# Patient Record
Sex: MALE | Race: WHITE | Employment: UNEMPLOYED | URBAN - METROPOLITAN AREA
[De-identification: names, ages, dates, MRNs, and addresses within clinical notes are randomized per-mention and may not be internally consistent; named-entity substitution may affect disease eponyms.]

---

## 2017-01-02 ENCOUNTER — THERAPY VISIT (OUTPATIENT)
Dept: PHYSICAL THERAPY | Facility: CLINIC | Age: 32
End: 2017-01-02
Payer: MEDICAID

## 2017-01-02 DIAGNOSIS — M54.6 CHRONIC BILATERAL THORACIC BACK PAIN: ICD-10-CM

## 2017-01-02 DIAGNOSIS — G89.29 CHRONIC BILATERAL THORACIC BACK PAIN: ICD-10-CM

## 2017-01-02 DIAGNOSIS — M54.2 CERVICAL PAIN: Primary | ICD-10-CM

## 2017-01-02 PROCEDURE — 97530 THERAPEUTIC ACTIVITIES: CPT | Mod: GP | Performed by: PHYSICAL THERAPIST

## 2017-01-09 ENCOUNTER — THERAPY VISIT (OUTPATIENT)
Dept: PHYSICAL THERAPY | Facility: CLINIC | Age: 32
End: 2017-01-09
Payer: MEDICAID

## 2017-01-09 DIAGNOSIS — G89.29 CHRONIC BILATERAL THORACIC BACK PAIN: ICD-10-CM

## 2017-01-09 DIAGNOSIS — M54.2 CERVICAL PAIN: Primary | ICD-10-CM

## 2017-01-09 DIAGNOSIS — M54.6 CHRONIC BILATERAL THORACIC BACK PAIN: ICD-10-CM

## 2017-01-09 PROCEDURE — 97530 THERAPEUTIC ACTIVITIES: CPT | Mod: GP | Performed by: PHYSICAL THERAPIST

## 2017-01-09 PROCEDURE — 97110 THERAPEUTIC EXERCISES: CPT | Mod: GP | Performed by: PHYSICAL THERAPIST

## 2017-01-16 ENCOUNTER — THERAPY VISIT (OUTPATIENT)
Dept: PHYSICAL THERAPY | Facility: CLINIC | Age: 32
End: 2017-01-16
Payer: MEDICAID

## 2017-01-16 DIAGNOSIS — M54.6 CHRONIC BILATERAL THORACIC BACK PAIN: ICD-10-CM

## 2017-01-16 DIAGNOSIS — G89.29 CHRONIC BILATERAL THORACIC BACK PAIN: ICD-10-CM

## 2017-01-16 DIAGNOSIS — M54.2 CERVICAL PAIN: Primary | ICD-10-CM

## 2017-01-16 PROCEDURE — 97110 THERAPEUTIC EXERCISES: CPT | Mod: GP | Performed by: PHYSICAL THERAPIST

## 2017-01-16 PROCEDURE — 97530 THERAPEUTIC ACTIVITIES: CPT | Mod: GP | Performed by: PHYSICAL THERAPIST

## 2017-01-23 ENCOUNTER — THERAPY VISIT (OUTPATIENT)
Dept: PHYSICAL THERAPY | Facility: CLINIC | Age: 32
End: 2017-01-23
Payer: MEDICAID

## 2017-01-23 DIAGNOSIS — G89.29 CHRONIC BILATERAL THORACIC BACK PAIN: ICD-10-CM

## 2017-01-23 DIAGNOSIS — M54.2 CERVICAL PAIN: Primary | ICD-10-CM

## 2017-01-23 DIAGNOSIS — M54.6 CHRONIC BILATERAL THORACIC BACK PAIN: ICD-10-CM

## 2017-01-23 PROCEDURE — 97110 THERAPEUTIC EXERCISES: CPT | Mod: GP | Performed by: PHYSICAL THERAPIST

## 2017-01-23 PROCEDURE — 97530 THERAPEUTIC ACTIVITIES: CPT | Mod: GP | Performed by: PHYSICAL THERAPIST

## 2017-01-30 ENCOUNTER — THERAPY VISIT (OUTPATIENT)
Dept: PHYSICAL THERAPY | Facility: CLINIC | Age: 32
End: 2017-01-30
Payer: MEDICAID

## 2017-01-30 DIAGNOSIS — M54.6 CHRONIC BILATERAL THORACIC BACK PAIN: ICD-10-CM

## 2017-01-30 DIAGNOSIS — M54.2 CERVICAL PAIN: Primary | ICD-10-CM

## 2017-01-30 DIAGNOSIS — G89.29 CHRONIC BILATERAL THORACIC BACK PAIN: ICD-10-CM

## 2017-01-30 PROCEDURE — 97530 THERAPEUTIC ACTIVITIES: CPT | Mod: GP | Performed by: PHYSICAL THERAPIST

## 2017-01-31 NOTE — PROGRESS NOTES
Subjective:    HPI                    Objective:    System    Physical Exam    General     ROS    Assessment/Plan:      PROGRESS  REPORT    OVERALL IMPRESSION:  Pt noted a very small amount of improvement in pain and function with a treatment emphasis focusing on the psychosocial component of pain with weekly functional and achievable short term goal setting.  At this point, I recommended that the patient continues with the prescribed home exercise program, gym exercises, and follow up as needed with physical therapy.  I also recommended the patient consider following up with a different therapist to exhaust all possible treatment options as the patient is against surgery, acupuncture, manual therapy, and a CSI's.  He feels there is almost no benefit of referring him back to the MD since there is nothing they can offer him.  Some other options discussed with other providers at our clinic are the benefit of a different set of eyes, TPR, and dry needling.  I recommend no more than a few more visits with another provider as the patient as already had extensive treatment between 2 different providers at our clinic.     SUBJECTIVE  Pt reports he has been having some issues with the neck and chest.  He feels like something needs cracked.  He feels like he may have over did it with something.  Overall, he feels like he has made some progress, but still a lot of ups and downs.      OBJECTIVE  Posture: Significant thoracic kyphosis, GHJ IR, FHP  Persisting RTC, scapular, and deep cx neck flexor weakness.  Neck ROM WNL        ASSESSMENT/PLAN  Updated problem list and treatment plan: Diagnosis 1:  Neck pain  Pain -  hot/cold therapy  Decreased ROM/flexibility - manual therapy and therapeutic exercise  Decreased joint mobility - manual therapy and therapeutic exercise  Decreased strength - therapeutic exercise and therapeutic activities  Decreased proprioception - neuro re-education and therapeutic activities  Inflammation -  cold therapy  Impaired muscle performance - neuro re-education  Decreased function - therapeutic activities  Impaired posture - neuro re-education  STG/LTGs have been met or progress has been made towards goals:  Yes (See Goal flow sheet completed today.)  Assessment of Progress: The patient's condition is improving.  Self Management Plans:  Patient has been instructed in a home treatment program.  I have re-evaluated this patient and find that the nature, scope, duration and intensity of the therapy is appropriate for the medical condition of the patient.  Pipe continues to require the following intervention to meet STG and LTG's:  PT    Recommendations:  This patient would benefit from continued therapy.     Frequency:  1 X week, once daily  Duration:  for 4 weeks      Please refer to the daily flowsheet for treatment today, total treatment time and time spent performing 1:1 timed codes.

## 2017-03-09 ENCOUNTER — THERAPY VISIT (OUTPATIENT)
Dept: PHYSICAL THERAPY | Facility: CLINIC | Age: 32
End: 2017-03-09
Payer: MEDICAID

## 2017-03-09 DIAGNOSIS — M54.6 CHRONIC BILATERAL THORACIC BACK PAIN: ICD-10-CM

## 2017-03-09 DIAGNOSIS — M54.2 CERVICAL PAIN: ICD-10-CM

## 2017-03-09 DIAGNOSIS — G89.29 CHRONIC BILATERAL THORACIC BACK PAIN: ICD-10-CM

## 2017-03-09 PROCEDURE — 97035 APP MDLTY 1+ULTRASOUND EA 15: CPT | Mod: GP | Performed by: PHYSICAL THERAPIST

## 2017-03-09 PROCEDURE — 97530 THERAPEUTIC ACTIVITIES: CPT | Mod: GP | Performed by: PHYSICAL THERAPIST

## 2017-03-09 NOTE — MR AVS SNAPSHOT
After Visit Summary   3/9/2017    Pipe Arzate    MRN: [id number]           Patient Information     Date Of Birth          1985        Visit Information        Provider Department      3/9/2017 4:50 PM Leslie aNranjo PT [facility]        Today's Diagnoses     Cervical pain        Chronic bilateral thoracic back pain           Follow-ups after your visit        Your next 10 appointments already scheduled     Mar 23, 2017  4:50 PM CDT   HANK Spine with Leslie Naranjo PT   [facility] ( [facility])    [address] [place] [id number] [phone]              Who to contact     If you have questions or need follow up information about today's clinic visit or your schedule please contact [facility] directly at [phone].  Normal or non-critical lab and imaging results will be communicated to you by [employer], letter or phone within 4 business days after the clinic has received the results. If you do not hear from us within 7 days, please contact the clinic through [employer] or phone. If you have a critical or abnormal lab result, we will notify you by phone as soon as possible.  Submit refill requests through [employer] or call your pharmacy and they will forward the refill request to us. Please allow 3 business days for your refill to be completed.          Additional Information About Your Visit        MyChart Information     [employer] gives you secure access to your electronic health record. If you see a primary care provider, you can also send messages to your care team and make appointments. If you have questions, please call your primary care clinic.  If you do not have a primary care provider, please call [phone] and they will assist you.        Care EveryWhere ID     This is your Care EveryWhere ID. This could be used by other organizations  to access your [place] medical records  [id number]         Blood Pressure from Last 3 Encounters:   06/13/16 122/86   05/16/16 124/82   05/02/16 120/80    Weight from Last 3 Encounters:   06/13/16 104.8 kg (231 lb)   05/16/16 104.8 kg (231 lb)   05/02/16 102.5 kg (226 lb)              Today, you had the following     No orders found for display       Primary Care Provider Office Phone # Fax #    Sotero Tang [phone]       INTERNAL MEDICINE [facility] [address]  [facility] [address]        Thank you!     Thank you for choosing [facility] PHYSICAL THERAPY [place]  for your care. Our goal is always to provide you with excellent care. Hearing back from our patients is one way we can continue to improve our services. Please take a few minutes to complete the written survey that you may receive in the mail after your visit with us. Thank you!             Your Updated Medication List - Protect others around you: Learn how to safely use, store and throw away your medicines at www.disposemymeds.org.          This list is accurate as of: 3/9/17 11:59 PM.  Always use your most recent med list.                   Brand Name Dispense Instructions for use    DEXTROSTAT PO      Take 2.5 mg by mouth daily.       DHA-EPA-VITAMIN E PO          EMSAM 12 MG/24HR 24 hr patch   Generic drug:  selegiline      Place 1 patch onto the skin daily.       NUTRITIONAL SUPPLEMENTS PO      Patient takes MULTIPLE supplements.       VIMPAT PO      Take 50 mg by mouth 2 times daily       VYVANSE PO      Take 20 mg by mouth daily       XANAX 0.25 MG tablet   Generic drug:  ALPRAZolam      Take 0.25-0.5 mg by mouth as needed

## 2017-03-23 ENCOUNTER — THERAPY VISIT (OUTPATIENT)
Dept: PHYSICAL THERAPY | Facility: CLINIC | Age: 32
End: 2017-03-23
Payer: MEDICAID

## 2017-03-23 DIAGNOSIS — G89.29 CHRONIC BILATERAL THORACIC BACK PAIN: ICD-10-CM

## 2017-03-23 DIAGNOSIS — M54.2 CERVICAL PAIN: ICD-10-CM

## 2017-03-23 DIAGNOSIS — M54.6 CHRONIC BILATERAL THORACIC BACK PAIN: ICD-10-CM

## 2017-03-23 PROCEDURE — 97110 THERAPEUTIC EXERCISES: CPT | Mod: GP | Performed by: PHYSICAL THERAPIST

## 2017-03-23 PROCEDURE — 97530 THERAPEUTIC ACTIVITIES: CPT | Mod: GP | Performed by: PHYSICAL THERAPIST

## 2017-03-23 NOTE — MR AVS SNAPSHOT
After Visit Summary   3/23/2017    Pipe Arzate    MRN: [id number]           Patient Information     Date Of Birth          1985        Visit Information        Provider Department      3/23/2017 4:50 PM Leslie Naranjo PT [facility]        Today's Diagnoses     Cervical pain        Chronic bilateral thoracic back pain           Follow-ups after your visit        Your next 10 appointments already scheduled     Apr 13, 2017  4:50 PM CDT   HANK Spine with Leslie Naranjo PT   [facility] ( [facility])    [address] [place] [id number] [phone]            May 02, 2017  4:50 PM CDT   HANK Spine with Leslie Naranjo PT   [facility] ( [facility])    [address] [place] [address] [phone]              Who to contact     If you have questions or need follow up information about today's clinic visit or your schedule please contact [facility] directly at [phone].  Normal or non-critical lab and imaging results will be communicated to you by [employer], letter or phone within 4 business days after the clinic has received the results. If you do not hear from us within 7 days, please contact the clinic through [employer] or phone. If you have a critical or abnormal lab result, we will notify you by phone as soon as possible.  Submit refill requests through [email or web address] or call your pharmacy and they will forward the refill request to us. Please allow 3 business days for your refill to be completed.          Additional Information About Your Visit        [employer] Information     [email or web address] gives you secure access to your electronic health record. If you see a primary care provider, you can also send messages to your care team and make appointments. If you have questions, please  call your primary care clinic.  If you do not have a primary care provider, please call [phone] and they will assist you.        Care EveryWhere ID     This is your Care EveryWhere ID. This could be used by other organizations to access your [place] medical records  [id number]         Blood Pressure from Last 3 Encounters:   06/13/16 122/86   05/16/16 124/82   05/02/16 120/80    Weight from Last 3 Encounters:   06/13/16 104.8 kg (231 lb)   05/16/16 104.8 kg (231 lb)   05/02/16 102.5 kg (226 lb)              We Performed the Following     HANK Progress Notes Report     Therapeutic Activities     Therapeutic Exercises        Primary Care Provider Office Phone # Fax #    Sotero Tang [phone]       INTERNAL MEDICINE [facility] [address] [facility] [address]  [facility] [address]        Thank you!     Thank you for choosing [facility] PHYSICAL THERAPY [place]  for your care. Our goal is always to provide you with excellent care. Hearing back from our patients is one way we can continue to improve our services. Please take a few minutes to complete the written survey that you may receive in the mail after your visit with us. Thank you!             Your Updated Medication List - Protect others around you: Learn how to safely use, store and throw away your medicines at www.disposemymeds.org.          This list is accurate as of: 3/23/17 11:59 PM.  Always use your most recent med list.                   Brand Name Dispense Instructions for use    DEXTROSTAT PO      Take 2.5 mg by mouth daily.       DHA-EPA-VITAMIN E PO          EMSAM 12 MG/24HR 24 hr patch   Generic drug:  selegiline      Place 1 patch onto the skin daily.       NUTRITIONAL SUPPLEMENTS PO      Patient takes MULTIPLE supplements.       VIMPAT PO      Take 50 mg by mouth 2 times daily       VYVANSE PO      Take 20 mg by mouth daily       XANAX 0.25 MG tablet   Generic drug:  ALPRAZolam      Take 0.25-0.5 mg by mouth as needed

## 2017-03-25 NOTE — PROGRESS NOTES
Subjective:    HPI                    Objective:    System    Physical Exam    General     ROS    Assessment/Plan:      PROGRESS  REPORT    Progress reporting period is from 1/30/2017 to 3/23/2017.       SUBJECTIVE  I am in a lot of pain today.  On Wednesday I had my lyphatic treatment, with pain through the chest following.  I have been trying to recover and manage things.  The last treatment with the ultrasound did not cause any major problems, may have helped a bit.  My neck felt a little looser for a while afterwards but nothing longstanding.  However, I feel for me a couple of days is a really big deal.  The exercises are going ok.    Tolerated isometric in clinic today.  Added to home program to provide specific cervical stability.    Current pain level is 6-8/10  .     Previous p  ain level was6/10.   Changes in function:  Yes, pt reports he is improving slowly and further engaged in exercise program.  From recent NDI, scores have progressively worsened.    Adverse reaction to treatment or activity: treatment - pt with poor response to prone strengthening, manual therapy.  Overall fair/poor exercise compliance and difficulty with pain management.      OBJECTIVE  Changes noted in objective findings:  The objective findings below are from DOS 3/23/2017.    Father presents with son today to further discuss treatment plan, as per last session discharge planning was to follow today.  Father discussed concern with secondary medical challenges such as sleep cycle and chronic pain.  Pt is working with sleep therapist.  As the treatment provider, I discussed my concerns with the lack of response to the treatment provided by Herber Woodard, PT as well as myself.  Pt with limited tolerance to exercise and manual therapy intervention.  Encouragment required throughout session to maintain postural engagement as well as exercise.  Recommended return to primary care to further discuss alternate options to physical therapy.   This discussion included injections, chiropractic care and acupuncture.  Decided on continuing with PT to progress repetitions of exercise until primary care appointment to further develop and finalize program as this was the request of the family as well as in the best interest for maintaining exercise involvement and pain management.      Observation:   Poor posture, cuing to avoid excessive shoulder elevation    Strength:   Tolerated isometric in clinic today.  Added to home program to provide specific cervical stability.            ASSESSMENT/PLAN  Updated problem list and treatment plan: Diagnosis 1:  Neck pain    Pain -  hot/cold therapy, US, electric stimulation, mechanical traction, manual therapy, self management, education, directional preference exercise and home program  Decreased ROM/flexibility - manual therapy and therapeutic exercise  Decreased joint mobility - manual therapy and therapeutic exercise  Decreased strength - therapeutic exercise and therapeutic activities  Decreased proprioception - neuro re-education and therapeutic activities  Impaired muscle performance - neuro re-education  Decreased function - therapeutic activities  Impaired posture - neuro re-education  STG/LTGs have been met or progress has been made towards goals:  Yes (See Goal flow sheet completed today.)  Assessment of Progress: The patient's progress has plateaued.  The patient's progress has slowed.  Self Management Plans:  Patient has been instructed in a home treatment program.  Patient  has been instructed in self management of symptoms.  I have re-evaluated this patient and find that the nature, scope, duration and intensity of the therapy is appropriate for the medical condition of the patient.  Pipe continues to require the following intervention to meet STG and LTG's:  PT    Recommendations:  This patient would benefit from continued therapy.     Frequency:  2 X a month, once daily  Duration:  for 1 months with  reassessment at this time.         Please refer to the daily flowsheet for treatment today, total treatment time and time spent performing 1:1 timed codes.

## 2017-04-13 ENCOUNTER — THERAPY VISIT (OUTPATIENT)
Dept: PHYSICAL THERAPY | Facility: CLINIC | Age: 32
End: 2017-04-13
Payer: MEDICAID

## 2017-04-13 DIAGNOSIS — G89.29 CHRONIC BILATERAL THORACIC BACK PAIN: ICD-10-CM

## 2017-04-13 DIAGNOSIS — M54.6 CHRONIC BILATERAL THORACIC BACK PAIN: ICD-10-CM

## 2017-04-13 DIAGNOSIS — M54.2 CERVICAL PAIN: ICD-10-CM

## 2017-04-13 PROCEDURE — 97110 THERAPEUTIC EXERCISES: CPT | Mod: GP | Performed by: PHYSICAL THERAPIST

## 2017-05-02 ENCOUNTER — THERAPY VISIT (OUTPATIENT)
Dept: PHYSICAL THERAPY | Facility: CLINIC | Age: 32
End: 2017-05-02
Payer: MEDICAID

## 2017-05-02 DIAGNOSIS — M54.2 CERVICAL PAIN: Primary | ICD-10-CM

## 2017-05-02 DIAGNOSIS — M54.6 CHRONIC BILATERAL THORACIC BACK PAIN: ICD-10-CM

## 2017-05-02 DIAGNOSIS — G89.29 CHRONIC BILATERAL THORACIC BACK PAIN: ICD-10-CM

## 2017-05-02 DIAGNOSIS — M54.2 CERVICAL PAIN: ICD-10-CM

## 2017-05-02 PROCEDURE — 97110 THERAPEUTIC EXERCISES: CPT | Mod: GP | Performed by: PHYSICAL THERAPIST

## 2017-05-02 PROCEDURE — 97530 THERAPEUTIC ACTIVITIES: CPT | Mod: GP | Performed by: PHYSICAL THERAPIST

## 2017-05-02 NOTE — MR AVS SNAPSHOT
After Visit Summary   5/2/2017    Pipe Arzate    MRN: [id number]           Patient Information     Date Of Birth          1985        Visit Information        Provider Department      5/2/2017 4:50 PM Leslie Naranjo PT [facility]        Today's Diagnoses     Cervical pain        Chronic bilateral thoracic back pain           Follow-ups after your visit        Who to contact     If you have questions or need follow up information about today's clinic visit or your schedule please contact [facility] directly at [phone].  Normal or non-critical lab and imaging results will be communicated to you by [employer], letter or phone within 4 business days after the clinic has received the results. If you do not hear from us within 7 days, please contact the clinic through [employer] or phone. If you have a critical or abnormal lab result, we will notify you by phone as soon as possible.  Submit refill requests through [employer] or call your pharmacy and they will forward the refill request to us. Please allow 3 business days for your refill to be completed.          Additional Information About Your Visit        MyChart Information     [employer] gives you secure access to your electronic health record. If you see a primary care provider, you can also send messages to your care team and make appointments. If you have questions, please call your primary care clinic.  If you do not have a primary care provider, please call [phone] and they will assist you.        Care EveryWhere ID     This is your Care EveryWhere ID. This could be used by other organizations to access your [place] medical records  [id number]         Blood Pressure from Last 3 Encounters:   06/13/16 122/86   05/16/16 124/82   05/02/16 120/80    Weight from Last 3 Encounters:   06/13/16 104.8 kg (231 lb)   05/16/16 104.8 kg (231 lb)   05/02/16 102.5 kg (226 lb)               We Performed the Following     HANK Progress Notes Report     Therapeutic Activities     Therapeutic Exercises        Primary Care Provider Office Phone # Fax #    Sotero Tang [phone]       INTERNAL MEDICINE [facility] [address] [facility] [address]  [facility] [address]        Thank you!     Thank you for choosing [facility] [place]  for your care. Our goal is always to provide you with excellent care. Hearing back from our patients is one way we can continue to improve our services. Please take a few minutes to complete the written survey that you may receive in the mail after your visit with us. Thank you!             Your Updated Medication List - Protect others around you: Learn how to safely use, store and throw away your medicines at www.disposemymeds.org.          This list is accurate as of: 5/2/17 11:59 PM.  Always use your most recent med list.                   Brand Name Dispense Instructions for use    DEXTROSTAT PO      Take 2.5 mg by mouth daily.       DHA-EPA-VITAMIN E PO          EMSAM 12 MG/24HR 24 hr patch   Generic drug:  selegiline      Place 1 patch onto the skin daily.       NUTRITIONAL SUPPLEMENTS PO      Patient takes MULTIPLE supplements.       VIMPAT PO      Take 50 mg by mouth 2 times daily       VYVANSE PO      Take 20 mg by mouth daily       XANAX 0.25 MG tablet   Generic drug:  ALPRAZolam      Take 0.25-0.5 mg by mouth as needed

## 2017-05-04 NOTE — PROGRESS NOTES
Subjective:    HPI                    Objective:    System    Physical Exam    General     ROS    Assessment/Plan:      PROGRESS  REPORT    Progress reporting period is from 3/23/2017 to 5/3/2017.       SUBJECTIVE  The active cervical motion was better than the isometrics.  However, I am having a lot of problems with certain areas of my neck.  I tired the exercises but I do not think it is helping.  I am in a mode now where I am just trying to avoid everything painful.  So I have done nothing for the last two weeks.  I did feel it was pretty upsetting in general.  Pain is around C7, deep.      Current pain level is 6/10  .       Changes in function:  Yes (See Goal flowsheet attached for changes in current functional level)  Adverse reaction to treatment or activity: None    OBJECTIVE  Changes noted in objective findings:  The objective findings below are from DOS 5/3/2017.  - Observation: improved seated posture at rest.  Pt continuing to report inability to complete home program due to pain and fear. Discussion on alternate therapy options.  Pt to be discharged from skilled PT at this time.      ASSESSMENT/PLAN  Updated problem list and treatment plan: Diagnosis 1:  Chronic neck pain    Pain -  home program  Decreased ROM/flexibility - manual therapy, therapeutic exercise and home program  Decreased joint mobility - manual therapy and therapeutic exercise  Decreased strength - therapeutic exercise and therapeutic activities  Impaired balance - neuro re-education and therapeutic activities  Decreased proprioception - neuro re-education and therapeutic activities  Impaired muscle performance - neuro re-education and home program  Decreased function - therapeutic activities  Impaired posture - neuro re-education  STG/LTGs have been met or progress has been made towards goals:  Yes (See Goal flow sheet completed today.)  Assessment of Progress: The patient's progress has plateaued.  Self Management Plans:  Patient  has  been instructed in self management of symptoms.  I have re-evaluated this patient and find that the nature, scope, duration and intensity of the therapy is appropriate for the medical condition of the patient.  Pipe continues to require the following intervention to meet STG and LTG's:  PT intervention is no longer required to meet STG/LTG.    Recommendations:  This patient is ready to be discharged from therapy and continue their home treatment program.    Please refer to the daily flowsheet for treatment today, total treatment time and time spent performing 1:1 timed codes.

## 2017-06-20 ENCOUNTER — TELEPHONE (OUTPATIENT)
Dept: FAMILY MEDICINE | Facility: CLINIC | Age: 32
End: 2017-06-20

## 2017-06-20 NOTE — TELEPHONE ENCOUNTER
Staff faxed signed and completed Plan/Updated Plan of Progress for Outpatient Rehabilitation to [facility] at [phone].      Montse Young MA

## 2018-02-23 NOTE — LETTER
DEPARTMENT OF HEALTH AND HUMAN SERVICES  CENTERS FOR MEDICARE & MEDICAID SERVICES    PLAN/UPDATED PLAN OF PROGRESS FOR OUTPATIENT REHABILITATION    PATIENTS NAME:  Pipe Arzate     : 1985    PROVID[facility] NUMBER:    [id number]    [id number]     PROVIDER NAME: [facility] [place]    MEDICAL RECORD NUMBER: [id number]     START OF CARE DATE:  SOC Date: 2016   TYPE:  PT    PRIMARY/TREATMENT DIAGNOSIS: (Pertinent Medical Diagnosis)  Cervical pain  Chronic bilateral thoracic back pain    VISITS FROM START OF CARE:  Rxs Used: 26     PROGRESS  REPORT    OVERALL IMPRESSION:  Pt noted a very small amount of improvement in pain and function with a treatment emphasis focusing on the psychosocial component of pain with weekly functional and achievable short term goal setting.  At this point, I recommended that the patient continues with the prescribed home exercise program, gym exercises, and follow up as needed with physical therapy.  I also recommended the patient consider following up with a different therapist to exhaust all possible treatment options as the patient is against surgery, acupuncture, manual therapy, and a CSI's.  He feels there is almost no benefit of referring him back to the MD since there is nothing they can offer him.  Some other options discussed with other providers at our clinic are the benefit of a different set of eyes, TPR, and dry needling.  I recommend no more than a few more visits with another provider as the patient as already had extensive treatment between 2 different providers at our clinic.     SUBJECTIVE  Pt reports he has been having some issues with the neck and chest.  He feels like something needs cracked.  He feels like he may have over did it with something.  Overall, he feels like he has made some progress, but still a lot of ups and downs.    OBJECTIVE  Posture: Significant thoracic kyphosis, GHJ IR, FHP  Persisting RTC, scapular, and deep cx  Pt and spouse left  CM still working on getting VNA set up  "neck flexor weakness.  Neck ROM WNL                PATIENTS NAME:  Pipe Arzate   : 1985      ASSESSMENT/PLAN  Updated problem list and treatment plan: Diagnosis 1:  Neck pain  Pain -  hot/cold therapy  Decreased ROM/flexibility - manual therapy and therapeutic exercise  Decreased joint mobility - manual therapy and therapeutic exercise  Decreased strength - therapeutic exercise and therapeutic activities  Decreased proprioception - neuro re-education and therapeutic activities  Inflammation - cold therapy  Impaired muscle performance - neuro re-education  Decreased function - therapeutic activities  Impaired posture - neuro re-education  STG/LTGs have been met or progress has been made towards goals:  Yes (See Goal flow sheet completed today.)  Assessment of Progress: The patient's condition is improving.  Self Management Plans:  Patient has been instructed in a home treatment program.  I have re-evaluated this patient and find that the nature, scope, duration and intensity of the therapy is appropriate for the medical condition of the patient.  Pipe continues to require the following intervention to meet STG and LTG's:  PT    Recommendations:  This patient would benefit from continued therapy.     Frequency:  1 X week, once daily  Duration:  for 4 weeks        Please refer to the daily flowsheet for treatment today, total treatment time and time spent performing 1:1 timed codes.              Caregiver Signature/Credentials _____________________________ Date ________       Treating Provider: Herber Woodard, ILANA, CSCS  I have reviewed and certified the need for these services and plan of treatment while under my care.        PHYSICIAN'S SIGNATURE:   _____________________________________  Date___________     Tyra Dubois    Certification period:  Beginning of Cert date period: 17 to End of Cert period date: 17     Functional Level Progress Report: Please see attached \"Goal Flow sheet for " "Functional level.\"    ____X____ Continue Services or       ________ DC Services                Service dates: From  SOC Date: 06/01/2017 date to present                         "

## 2019-12-09 ENCOUNTER — HEALTH MAINTENANCE LETTER (OUTPATIENT)
Age: 34
End: 2019-12-09

## 2021-01-15 ENCOUNTER — HEALTH MAINTENANCE LETTER (OUTPATIENT)
Age: 36
End: 2021-01-15

## 2021-10-24 ENCOUNTER — HEALTH MAINTENANCE LETTER (OUTPATIENT)
Age: 36
End: 2021-10-24

## 2022-02-13 ENCOUNTER — HEALTH MAINTENANCE LETTER (OUTPATIENT)
Age: 37
End: 2022-02-13

## 2022-10-15 ENCOUNTER — HEALTH MAINTENANCE LETTER (OUTPATIENT)
Age: 37
End: 2022-10-15

## 2023-03-26 ENCOUNTER — HEALTH MAINTENANCE LETTER (OUTPATIENT)
Age: 38
End: 2023-03-26